# Patient Record
Sex: MALE | Race: OTHER | HISPANIC OR LATINO | URBAN - METROPOLITAN AREA
[De-identification: names, ages, dates, MRNs, and addresses within clinical notes are randomized per-mention and may not be internally consistent; named-entity substitution may affect disease eponyms.]

---

## 2017-03-29 ENCOUNTER — OUTPATIENT (OUTPATIENT)
Dept: OUTPATIENT SERVICES | Facility: HOSPITAL | Age: 37
LOS: 1 days | Discharge: HOME | End: 2017-03-29

## 2017-03-29 ENCOUNTER — APPOINTMENT (OUTPATIENT)
Dept: INTERNAL MEDICINE | Facility: CLINIC | Age: 37
End: 2017-03-29

## 2017-03-29 VITALS
HEIGHT: 70.5 IN | BODY MASS INDEX: 25.48 KG/M2 | WEIGHT: 180 LBS | DIASTOLIC BLOOD PRESSURE: 71 MMHG | SYSTOLIC BLOOD PRESSURE: 126 MMHG | HEART RATE: 76 BPM

## 2017-06-27 DIAGNOSIS — M25.561 PAIN IN RIGHT KNEE: ICD-10-CM

## 2017-06-27 DIAGNOSIS — R10.13 EPIGASTRIC PAIN: ICD-10-CM

## 2017-06-27 DIAGNOSIS — M25.511 PAIN IN RIGHT SHOULDER: ICD-10-CM

## 2017-07-05 ENCOUNTER — OUTPATIENT (OUTPATIENT)
Dept: OUTPATIENT SERVICES | Facility: HOSPITAL | Age: 37
LOS: 1 days | Discharge: HOME | End: 2017-07-05

## 2017-07-05 ENCOUNTER — APPOINTMENT (OUTPATIENT)
Dept: INTERNAL MEDICINE | Facility: CLINIC | Age: 37
End: 2017-07-05

## 2017-07-05 VITALS
HEIGHT: 70.5 IN | SYSTOLIC BLOOD PRESSURE: 114 MMHG | DIASTOLIC BLOOD PRESSURE: 71 MMHG | HEART RATE: 73 BPM | WEIGHT: 172 LBS | BODY MASS INDEX: 24.35 KG/M2

## 2017-07-05 DIAGNOSIS — M25.511 PAIN IN RIGHT SHOULDER: ICD-10-CM

## 2017-07-05 DIAGNOSIS — K29.70 GASTRITIS, UNSPECIFIED, W/OUT BLEEDING: ICD-10-CM

## 2017-07-05 DIAGNOSIS — G89.29 PAIN IN RIGHT SHOULDER: ICD-10-CM

## 2017-07-05 DIAGNOSIS — R10.13 EPIGASTRIC PAIN: ICD-10-CM

## 2017-07-05 DIAGNOSIS — M25.561 PAIN IN RIGHT KNEE: ICD-10-CM

## 2017-07-05 RX ORDER — PANTOPRAZOLE 40 MG/1
40 TABLET, DELAYED RELEASE ORAL DAILY
Qty: 30 | Refills: 2 | Status: COMPLETED | COMMUNITY
Start: 2017-03-29 | End: 2017-07-05

## 2017-07-05 RX ORDER — OMEPRAZOLE 40 MG/1
40 CAPSULE, DELAYED RELEASE ORAL
Qty: 30 | Refills: 2 | Status: ACTIVE | COMMUNITY
Start: 2017-07-05 | End: 1900-01-01

## 2019-04-02 ENCOUNTER — OUTPATIENT (OUTPATIENT)
Dept: OUTPATIENT SERVICES | Facility: HOSPITAL | Age: 39
LOS: 1 days | Discharge: HOME | End: 2019-04-02

## 2019-04-04 DIAGNOSIS — Z01.21 ENCOUNTER FOR DENTAL EXAMINATION AND CLEANING WITH ABNORMAL FINDINGS: ICD-10-CM

## 2022-04-04 ENCOUNTER — EMERGENCY (EMERGENCY)
Facility: HOSPITAL | Age: 42
LOS: 0 days | Discharge: HOME | End: 2022-04-04
Attending: EMERGENCY MEDICINE | Admitting: EMERGENCY MEDICINE
Payer: MEDICAID

## 2022-04-04 VITALS
SYSTOLIC BLOOD PRESSURE: 134 MMHG | OXYGEN SATURATION: 99 % | RESPIRATION RATE: 18 BRPM | TEMPERATURE: 98 F | HEART RATE: 70 BPM | DIASTOLIC BLOOD PRESSURE: 62 MMHG

## 2022-04-04 DIAGNOSIS — K08.89 OTHER SPECIFIED DISORDERS OF TEETH AND SUPPORTING STRUCTURES: ICD-10-CM

## 2022-04-04 PROCEDURE — 99282 EMERGENCY DEPT VISIT SF MDM: CPT

## 2022-04-04 NOTE — ED ADULT NURSE NOTE - NSIMPLEMENTINTERV_GEN_ALL_ED
Implemented All Universal Safety Interventions:  [place] to call system. Call bell, personal items and telephone within reach. Instruct patient to call for assistance. Room bathroom lighting operational. Non-slip footwear when patient is off stretcher. Physically safe environment: no spills, clutter or unnecessary equipment. Stretcher in lowest position, wheels locked, appropriate side rails in place.

## 2022-04-04 NOTE — ED PROVIDER NOTE - OBJECTIVE STATEMENT
41-year-old male no past medical history presents emergency department with left lower tooth pain for the past 3 days.  Symptoms started after biting into popcorn and felt like something was stuck.  Patient reports increased sensitivity, decreased p.o. intake, sensitivity to temperature.  No fever.  Patient denies any skin rash or facial swelling.  Patient reports pain is 4 out of 10, sharp in nature, worse with chewing, and nonradiating.

## 2022-04-04 NOTE — ED PROVIDER NOTE - NS ED ROS FT
Constitutional:  No fever, chills, lethargy, or abnormal weight loss  ENMT: + dental pain  Neuro:  No headache.   Except as documented in the HPI,  all other systems are negative

## 2022-04-04 NOTE — ED PROVIDER NOTE - CLINICAL SUMMARY MEDICAL DECISION MAKING FREE TEXT BOX
Patient presents to the Emergency Department with dental pain.     On exam, VS reviewed and airway is intact. Patient no no palpable abscess. No swelling to floor of mouth. Patient spoken to and plan of care made to send to dental clinic for further work up and evaluation.   Full DC instructions discussed for after dental evaluation. Patient aware when to return to the ED immediately.

## 2022-04-04 NOTE — ED PROVIDER NOTE - PHYSICAL EXAMINATION
VITAL SIGNS: I have reviewed nursing notes and confirm.  CONSTITUTIONAL: well-appearing, non-toxic, NAD  SKIN: Warm dry, normal skin turgor  HEAD: NCAT  ENT: Moist mucous membranes, normal pharynx with no erythema or exudates. + Tenderness to percussion to tooth #18, 19. No palpable abscess  NECK: Supple; non tender. Full ROM. No cervical LAD  CARD: RRR, no murmurs, rubs or gallops  RESP: clear to ausculation b/l.  No rales, rhonchi, or wheezing.  NEURO: normal motor. normal sensory. CN II-XII intact. Cerebellar testing normal. Normal gait.  PSYCH: Cooperative, appropriate.

## 2022-04-04 NOTE — CONSULT NOTE ADULT - SUBJECTIVE AND OBJECTIVE BOX
Patient is a 41y old  Male who presents with a chief complaint of dental pain on the lower left tooth after biting into a popcorn.    HPI: Patient stated that this happened three days ago after he bit into a popcorn which fractured a dental restoration on the lower left. He started developing sensitivity.      PAST MEDICAL & SURGICAL HISTORY: denied    ( -  ) heart valve replacement  ( -  ) joint replacement  ( -  ) pregnancy    MEDICATIONS  (STANDING): denied    MEDICATIONS  (PRN): denied      Allergies: no known drug allergy      *SOCIAL HISTORY: ( -  ) Tobacco; ( -  ) ETOH    *Last Dental Visit: 2019    Vital Signs Last 24 Hrs  T(C): 36.4 (04 Apr 2022 09:06), Max: 36.4 (04 Apr 2022 09:06)  T(F): 97.5 (04 Apr 2022 09:06), Max: 97.5 (04 Apr 2022 09:06)  HR: 70 (04 Apr 2022 09:06) (70 - 70)  BP: 134/62 (04 Apr 2022 09:06) (134/62 - 134/62)  BP(mean): --  RR: 18 (04 Apr 2022 09:06) (18 - 18)  SpO2: 99% (04 Apr 2022 09:06) (99% - 99%)    EOE:  TMJ ( -  ) clicks                     ( -  ) pops                     ( -  ) crepitus             Mandible <<FROM>>             Facial bones and MOM <<grossly intact>>             ( -  ) trismus             ( -  ) lymphadenopathy             ( -  ) swelling             ( -  ) asymmetry             ( -  ) palpation             ( -  ) dyspnea             ( -  ) dysphagia             ( -  ) loss of consciousness    IOE:  <<permanent>> dentition: <<grossly intact>>           hard/soft palate:  (   ) palatal torus, <<No pathology noted>>           tongue/FOM <<No pathology noted>>           labial/buccal mucosa <<No pathology noted>>           ( +  ) percussion           ( +  ) palpation           ( -  ) swelling            ( -  ) abscess           ( -  ) sinus tract    *DENTAL RADIOGRAPHS: Periapical of #18.     *ASSESSMENT: Patient is a 41y old  Male who presents with a chief complaint of dental pain on the lower left tooth after biting into a popcorn. Patient stated that this happened three days ago after he bit into a popcorn which fractured a dental restoration on the lower left. He started developing sensitivity. #18 positive to percussion with missing dental restoration on #18 OB. Explain to patient that the periodontal ligament inflamed due to trauma from biting into the popcorn. Inform patient that #18 needs to have a new restoration done and advise patient to make appointment for comprehensive exam and cleaning.     *PLAN: Recommend patient to take over the counter anti-inflammatory medication for pain. Soft food. Patient to schedule appointment for exam and treatment.    RECOMMENDATIONS:  1) Recommend patient to take over the counter anti-inflammatory medication for pain.  2) Patient to call and make appointment for hygiene.   3) If any difficulty swallowing/breathing, fever occur, return to ER.     Reanna Nava DDS pager #[phone]

## 2022-12-02 PROBLEM — Z00.00 ENCOUNTER FOR PREVENTIVE HEALTH EXAMINATION: Status: ACTIVE | Noted: 2022-12-02

## 2023-05-15 PROBLEM — Z78.9 OTHER SPECIFIED HEALTH STATUS: Chronic | Status: ACTIVE | Noted: 2022-04-05

## 2023-06-21 ENCOUNTER — APPOINTMENT (OUTPATIENT)
Dept: INTERNAL MEDICINE | Facility: CLINIC | Age: 43
End: 2023-06-21

## 2023-06-21 DIAGNOSIS — Z00.00 ENCOUNTER FOR GENERAL ADULT MEDICAL EXAMINATION W/OUT ABNORMAL FINDINGS: ICD-10-CM
